# Patient Record
Sex: MALE | Race: WHITE | NOT HISPANIC OR LATINO | URBAN - METROPOLITAN AREA
[De-identification: names, ages, dates, MRNs, and addresses within clinical notes are randomized per-mention and may not be internally consistent; named-entity substitution may affect disease eponyms.]

---

## 2022-10-03 ENCOUNTER — HOSPITAL ENCOUNTER (EMERGENCY)
Facility: MEDICAL CENTER | Age: 67
End: 2022-10-04
Attending: EMERGENCY MEDICINE
Payer: MEDICARE

## 2022-10-03 DIAGNOSIS — M25.562 ACUTE PAIN OF LEFT KNEE: ICD-10-CM

## 2022-10-03 PROCEDURE — 99284 EMERGENCY DEPT VISIT MOD MDM: CPT

## 2022-10-03 RX ORDER — HYDROCODONE BITARTRATE AND ACETAMINOPHEN 5; 325 MG/1; MG/1
1 TABLET ORAL ONCE
Status: COMPLETED | OUTPATIENT
Start: 2022-10-04 | End: 2022-10-04

## 2022-10-04 VITALS
OXYGEN SATURATION: 95 % | HEART RATE: 98 BPM | RESPIRATION RATE: 16 BRPM | DIASTOLIC BLOOD PRESSURE: 86 MMHG | HEIGHT: 66 IN | WEIGHT: 150 LBS | BODY MASS INDEX: 24.11 KG/M2 | TEMPERATURE: 97 F | SYSTOLIC BLOOD PRESSURE: 136 MMHG

## 2022-10-04 PROCEDURE — 700102 HCHG RX REV CODE 250 W/ 637 OVERRIDE(OP): Performed by: EMERGENCY MEDICINE

## 2022-10-04 PROCEDURE — A9270 NON-COVERED ITEM OR SERVICE: HCPCS | Performed by: EMERGENCY MEDICINE

## 2022-10-04 RX ADMIN — HYDROCODONE BITARTRATE AND ACETAMINOPHEN 1 TABLET: 5; 325 TABLET ORAL at 00:00

## 2022-10-04 NOTE — ED PROVIDER NOTES
ED Provider Note    CHIEF COMPLAINT  Chief Complaint   Patient presents with    Leg Pain     BIBA for L hip/knee/leg/foot pain  [facility] about not prescribing pain medication for the chronic pain he has  Pt states he has chronic L leg pain that has began worsening, was recently switched to naproxen which is not helping the pain  Pt wanting stronger pain medication and muscle relaxers, difficulty ambulating due to pain  Denies any recent trauma or injury           HPI    Primary care provider: Pcp Pt States None   History obtained from: Patient  History limited by: None     Silverio Akers is a 67 y.o. male who presents to the ED by EMS complaining of left knee pain for about 1 week.  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  He also has chronic back pain.  He states that [facility] but is not working.  He was also prescribed Flexeril for back spasms.  He states that the reason he came to the ED was because the pain became worse tonight.  No particular inciting event.  He denies fever/chest pain/shortness of breath or difficulty breathing/abdominal pain.  He did have nausea this morning without vomiting and reports 3 episodes of diarrhea this morning without gross blood noted.  He denies dysuria.  He reports numbness to his left foot but denies any other sensory change.  He denies any new weakness.  No rash noted.    REVIEW OF SYSTEMS  Please see HPI for pertinent positives/negatives.  All other systems reviewed and are negative.     PAST MEDICAL HISTORY  Past Medical History:   Diagnosis Date    Depression         SURGICAL HISTORY  History reviewed. No pertinent surgical history.     SOCIAL HISTORY  Social History     Tobacco Use    Smoking status: Former     Types: Cigarettes    Smokeless tobacco: Never   Substance and Sexual Activity    Alcohol use: Yes    Drug  "use: Yes     Comment: marijuana    Sexual activity: Not on file        FAMILY HISTORY  History reviewed. No pertinent family history.     CURRENT MEDICATIONS  Home Medications       Reviewed by Travis Pettit R.N. (Registered Nurse) on 10/03/22 at 2229  Med List Status: Not Addressed     Medication Last Dose Status        Patient Mario Taking any Medications                            ALLERGIES  Allergies   Allergen Reactions    Penicillins Anaphylaxis        PHYSICAL EXAM  VITAL SIGNS: /86   Pulse 98   Temp 36.1 °C (97 °F) (Temporal)   Resp 16   Ht 1.676 m (5' 6\")   Wt 68 kg (150 lb)   SpO2 95%   BMI 24.21 kg/m²  @DIANE[[id number]::@     Pulse ox interpretation: 94% I interpret this pulse ox as normal     Constitutional: Well developed, well nourished, alert, sitting in bed reading a book in no apparent distress, nontoxic appearance    HENT: No external signs of trauma, normocephalic, mask on due to COVID-19 pandemic  Eyes: PERRL, conjunctiva without erythema, no discharge, no icterus    Neck: Soft and supple, trachea midline, no stridor, no tenderness, no LAD, no JVD, good ROM    Cardiovascular: Regular rate and rhythm, no murmurs/rubs/gallops, strong distal pulses and good perfusion    Thorax & Lungs: No respiratory distress, CTAB   Abdomen: Soft, nontender, nondistended, no guarding, no rebound, normal BS    Back: No CVAT    Extremities: No cyanosis, tenderness to left knee along medial and lateral joint lines, no edema, no gross deformity, good ROM, no apparent laxity on stress testing, intact distal pulses with brisk cap refill, sensation intact to touch throughout, distal 5/5 strength  Skin: Warm, dry, no pallor/cyanosis, no rash noted    Lymphatic: No lymphadenopathy noted    Neuro: A/O times 3, no focal deficits noted    Psychiatric: Cooperative, normal mood and affect      DIAGNOSTIC STUDIES / PROCEDURES        LABS  All labs reviewed by me.     No results found for this or any previous visit. "     RADIOLOGY  The radiologist's interpretation of all radiological studies have been reviewed by me.     No orders to display          COURSE & MEDICAL DECISION MAKING  Nursing notes, VS, PMSFHx reviewed in chart.     Review of past medical records shows the patient was last seen in this ED March 18, 2003 for abdominal pain.      Differential diagnoses considered include but are not limited to: Arthritis, bursitis, tendonitis, DVT/vascular occlusion, strain/sprain, neuropathy      History and physical exam as above.  This is an alert, well-appearing patient in no acute distress and nontoxic in appearance.  He is sitting in bed reading a book and his exam is benign.  At this time, I have low clinical suspicion for emergent pathology such as sepsis, septic joint, necrotizing fasciitis, abscess/cellulitis, DVT/vascular occlusion or compartment syndrome.  Patient declined imaging studies and just wanted pain medicine for tonight.  He was given a dose of Norco.  He was advised on outpatient follow-up and given return to ED precautions.  He verbalized understanding and agreed with plan of care with no further questions or concerns.      The patient is referred to a primary physician for blood pressure management, diabetic screening, and for all other preventative health concerns.       FINAL IMPRESSION  1. Acute pain of left knee Acute          DISPOSITION  Patient will be discharged home in stable condition.       FOLLOW UP  [facility]    Call in [facility] day      [facility], Emergency Dept  [facility] [facility] [address] [phone]    If symptoms worsen       OUTPATIENT MEDICATIONS  There are no discharge medications for this patient.         Electronically signed by: Óscar Toscano D.O., 10/3/2022 11:43 PM      Portions of this record were made with voice recognition software.  Despite my review, spelling/grammar/context errors may still remain.  Interpretation  of this chart should be taken in this context.

## 2022-10-04 NOTE — ED NOTES
Pt discharged, all appropriate hospital equipment removed (IV, monitor, pulse ox, etc.). Pt left unit via walking to home. Personal belongings with pt when leaving unit. Pt given discharge instructions prior to leaving unit including where to  prescriptions and when to follow-up; verbalizes understanding. Pt informed to return to ED if symptoms worsen/return or altered status develop. Copy of discharge instructions signed and turned into DC basket and copy sent with pt. [facility]

## 2022-10-04 NOTE — ED TRIAGE NOTES
"Chief Complaint   Patient presents with    Leg Pain     BIBA for L hip/knee/leg/foot pain  [facility] pt about not prescribing pain medication for the chronic pain he has  Pt states he has chronic L leg pain that has began worsening, was recently switched to naproxen which is not helping the pain  Pt wanting stronger pain medication and muscle relaxers, difficulty ambulating due to pain  Denies any recent trauma or injury        BP (!) 139/90   Pulse 100   Temp 36.3 °C (97.3 °F) (Temporal)   Resp 18   Ht 1.676 m (5' 6\")   Wt 68 kg (150 lb)   SpO2 94%   BMI 24.21 kg/m²     Pt made aware of triage process, placed back into lobby, educated pt to tell staff of any worsening of symptoms     "